# Patient Record
(demographics unavailable — no encounter records)

---

## 2024-10-07 NOTE — HISTORY OF PRESENT ILLNESS
[None] : The patient is currently asymptomatic [FreeTextEntry1] : 58-year-old female with a medical history of hypertension, hyperlipidemia,  diabetes mellitus?, who presents with joint pain in both hands. She is accompanied by her son who helped for translation assistance.  The patient reports that her hand joint pain started approximately one year ago and occurs daily. She occasionally takes Tylenol and uses Voltaren gel twice daily, which provides some benefit. Approximately 5 months ago, she visited the ED at [facility] for similar symptoms. At that time, X-rays of both hands were performed, revealing degenerative changes in the DIP and CMC joints. Following this visit, she was referred to rheumatology for further evaluation. The patient denies experiencing any additional symptoms, including pain in other joints, oral or nasal ulcers, alopecia, rash, fever, weight loss, shortness of breath, or chest pain.  No family history of CTD

## 2024-10-07 NOTE — ASSESSMENT
[FreeTextEntry1] : # CMC joint OA - Reports pain in both joints since last year  - Tenderness in CMC joints and X-ray findings of degenerative changes in the DIP and CMC joints  - Continue the use of Tylenol as needed and increase Voltaren gel 3-4 times daily. - Discussed the importance of joint protection strategies, activity modification, and the potential role of physical therapy to improve function and reduce pain. Referral given to Hand therapy  - Order labs   RTC in 3 months  D/w Dr. Laz Horan MD Rheumatology Fellow PGY-5

## 2024-10-07 NOTE — PHYSICAL EXAM
[General Appearance - Alert] : alert [General Appearance - In No Acute Distress] : in no acute distress [Sclera] : the sclera and conjunctiva were normal [PERRL With Normal Accommodation] : pupils were equal in size, round, and reactive to light [Extraocular Movements] : extraocular movements were intact [Outer Ear] : the ears and nose were normal in appearance [Oropharynx] : the oropharynx was normal [Neck Appearance] : the appearance of the neck was normal [Neck Cervical Mass (___cm)] : no neck mass was observed [Jugular Venous Distention Increased] : there was no jugular-venous distention [Thyroid Diffuse Enlargement] : the thyroid was not enlarged [Thyroid Nodule] : there were no palpable thyroid nodules [Auscultation Breath Sounds / Voice Sounds] : lungs were clear to auscultation bilaterally [Heart Rate And Rhythm] : heart rate was normal and rhythm regular [Heart Sounds] : normal S1 and S2 [Bowel Sounds] : normal bowel sounds [Abdomen Soft] : soft [Abdomen Tenderness] : non-tender [Abdomen Mass (___ Cm)] : no abdominal mass palpated [Skin Color & Pigmentation] : normal skin color and pigmentation [] : no rash [Skin Turgor] : normal skin turgor [Deep Tendon Reflexes (DTR)] : deep tendon reflexes were 2+ and symmetric [Sensation] : the sensory exam was normal to light touch and pinprick [No Focal Deficits] : no focal deficits [Oriented To Time, Place, And Person] : oriented to person, place, and time [Impaired Insight] : insight and judgment were intact [Affect] : the affect was normal [FreeTextEntry1] : Tenderness on the CMC joints, no swelling. No synovitis

## 2024-10-07 NOTE — HISTORY OF PRESENT ILLNESS
[None] : The patient is currently asymptomatic [FreeTextEntry1] : 58-year-old female with a medical history of hypertension, hyperlipidemia,  diabetes mellitus?, who presents with joint pain in both hands. She is accompanied by her son who helped for translation assistance.  The patient reports that her hand joint pain started approximately one year ago and occurs daily. She occasionally takes Tylenol and uses Voltaren gel twice daily, which provides some benefit. Approximately 5 months ago, she visited the ED at [facility] for similar symptoms. At that time, X-rays of both hands were performed, revealing degenerative changes in the DIP and CMC joints. Following this visit, she was referred to rheumatology for further evaluation. The patient denies experiencing any additional symptoms, including pain in other joints, oral or nasal ulcers, alopecia, rash, fever, weight loss, shortness of breath, or chest pain.  No family history of CTD

## 2024-10-07 NOTE — PHYSICAL EXAM
[General Appearance - Alert] : alert [General Appearance - In No Acute Distress] : in no acute distress [Sclera] : the sclera and conjunctiva were normal [PERRL With Normal Accommodation] : pupils were equal in size, round, and reactive to light [Extraocular Movements] : extraocular movements were intact [Outer Ear] : the ears and nose were normal in appearance [Oropharynx] : the oropharynx was normal [Neck Appearance] : the appearance of the neck was normal [Jugular Venous Distention Increased] : there was no jugular-venous distention [Neck Cervical Mass (___cm)] : no neck mass was observed [Thyroid Diffuse Enlargement] : the thyroid was not enlarged [Thyroid Nodule] : there were no palpable thyroid nodules [Auscultation Breath Sounds / Voice Sounds] : lungs were clear to auscultation bilaterally [Heart Rate And Rhythm] : heart rate was normal and rhythm regular [Heart Sounds] : normal S1 and S2 [Bowel Sounds] : normal bowel sounds [Abdomen Soft] : soft [Abdomen Tenderness] : non-tender [Abdomen Mass (___ Cm)] : no abdominal mass palpated [Skin Color & Pigmentation] : normal skin color and pigmentation [Skin Turgor] : normal skin turgor [] : no rash [Deep Tendon Reflexes (DTR)] : deep tendon reflexes were 2+ and symmetric [Sensation] : the sensory exam was normal to light touch and pinprick [No Focal Deficits] : no focal deficits [Oriented To Time, Place, And Person] : oriented to person, place, and time [Impaired Insight] : insight and judgment were intact [Affect] : the affect was normal [FreeTextEntry1] : Tenderness on the CMC joints, no swelling. No synovitis

## 2025-02-07 NOTE — HISTORY OF PRESENT ILLNESS
[FreeTextEntry1] : This is a 58-year-old female who has come to the office referred by [facility] upon discharge from the emergency department.  Patient had gone to the emergency department at [facility] on January of 10th 2025 for abdominal or chest discomfort had a workup performed there and was asked to follow-up with the vascular as well as cardiology.  Patient has come to the office accompanied by her family member.  Patient states that she is asymptomatic at this time has no pain or discomfort in the abdomen or the chest.  According to the history as well as reviewing the reports from the hospital she is referred here for dilatation of the aorta in the chest patient had undergone a CAT scan of the chest and abdomen on 10th January and the reports are available and reviewed.  Patient has a 4.6 cm dilatation of the ascending aorta with no evidence of abdominal aortic aneurysm or descending thoracic aorta.  The patient also was advised to follow-up with the cardiology for which patient has made appointment patient has no chest pain no back pain no upper back pain.  Patient has a history of arthritis of both hands this especially in the CMC joints and is being followed by rheumatology clinic.  The patient says her right hand bothers her more than the left.  The patient is currently on gabapentin, meloxicam, methocarbamol, calcium and bisoprolol. The patient does not speak English well or understands patient speaks in the animal Wolof and the family member agreed for interpretation.

## 2025-02-07 NOTE — ASSESSMENT
[FreeTextEntry1] : This is a 58-year-old female who has come to the office post discharge from the emergency department as a routine follow-up for her 4.6 cm ascending aortic aneurysm dilatation with no abdominal aortic dilatation or aneurysm.

## 2025-02-07 NOTE — PHYSICAL EXAM
[JVD] : no jugular venous distention  [Normal Thyroid] : the thyroid was normal [Carotid Bruits] : no carotid bruits [Normal Breath Sounds] : Normal breath sounds [Normal Heart Sounds] : normal heart sounds [Right Carotid Bruit] : no bruit heard over the right carotid [Left Carotid Bruit] : no bruit heard over the left carotid [2+] : left 2+ [Right Femoral Bruit] : no bruit heard over the right femoral artery [Left Femoral Bruit] : no bruit heard over the left femoral artery [1+] : left 1+ [Ankle Swelling (On Exam)] : not present [Varicose Veins Of Lower Extremities] : not present [] : not present [Abdomen Masses] : No abdominal masses [Enlarged] : not enlarged [Tender] : was nontender [Stool Sample Taken] : No stool obtained  on rectal exam [No Rash or Lesion] : No rash or lesion [Purpura] : no purpura  [Petechiae] : no petechiae [Skin Ulcer] : no ulcer [Skin Induration] : no induration [Alert] : not alert [Oriented to Person] : disoriented to person [Oriented to Place] : disoriented to place [Oriented to Time] : disoriented to time [Calm] : calm [de-identified] : Well-built well-nourished [de-identified] : Within normal limits [de-identified] : Within normal limits [TextEntry] : Patient is seen in supine sitting and standing positions.  Head and neck upper extremity exams are done.  Patient has a swelling on the both hands more on the right than the left particularly at the carpometacarpal junction of the right thumb on the palmar hand.  The findings consistent with her history of arthritis and similar findings on the left hand to a lesser degree.  Respiratory cardiac exams are grossly normal abdomen is soft nonpulsatile nontender nondistended.  Both lower extremities without any swelling at the ankles and grossly with palpable pedal pulses and no venous stasis.  No abdominal no flank tenderness

## 2025-02-07 NOTE — DATA REVIEWED
[FreeTextEntry1] : CT scan of chest abdomen and pelvis from January 10 is reviewed and shows ascending aortic aneurysm of 4.6 cm.  No abdominal aortic aneurysm

## 2025-02-07 NOTE — PLAN
[TextEntry] : I have discussed at length with the patient and the patient's family member explained to her in her own language in the as well as English and have advised her to follow-up with cardiology.  She does have an appointment with a cardiologist Dr.poumpouridis Gupta.  She would benefit from a consultation with cardiologist as well as a possible cardiac surgeon for the follow-up and surveillance and eventually if needed repair of the ascending aortic aneurysm.  I have explained to the patient as well as her daughter that I do not perform those kind of surgeries and usually performed by the cardiac surgeon.  No further appointment is given to my office.

## 2025-02-07 NOTE — REASON FOR VISIT
Echo  -   07/29/24     EF reported at 50% (measured 58% biplane).  Anterolateral wall motion abnormality.    Moderate to severe aortic stenosis reported (mean gradient 16, DI 0.28, JOSE ARMANDO 0.9, SVI 37, LVOT diameter measurement may have been slightly low).    Mild AI.  Moderate MR.  [Consultation] : a consultation visit

## 2025-02-13 NOTE — REASON FOR VISIT
[FreeTextEntry1] : 58 year old Female with PMH of HLD, bilateral hand arthritis, pre-DM, HTN, Gastritis, who is here for cardiology referral. Went to [place] ED on 01/10/25 for chest pain and dyspnea, had troponin <3, ECG without significant ischemic changes, CTA C/A/P which showed 4.6 ascending aortic dilation but no dissection, heart appeared within normal limits, and bilateral atelectasis. BNP 71. She was given some pain medication and symptoms resolved, discharged with plan for vascular and cardiology eval. Had n/v with pain and all symptoms resolved with anti-emetics and PPI. Daughter thinks patient was stressed during that episode.   Saw vascular who recommended cardiology/cardiac surgeon f/u of aortic aneurysm.   She has unlimited exercise tolerance, constantly walking and going up stairs. No chest pain since the ED visit which was thought to be more abdominal in nature. No family history of aortic dissection, MI, or SCD.  Today, vitals are WNL /88, HR 70, spo2 96% on RA.   A1C in October 2024 was 6.3%. At [facility] last month, CMP, CBC unremarkable. Will plan for stress test, cannot do exercise given 4.6 cm ascending thoracic aortic aneurysm so will go for pharmacologic nuclear stress test. Will have family fax over Lipid profile from outpatient PCP and patient will follow up in 6 months with me. At that time, will get surveillance imaging of aorta.   Medications: Pantoprazole Bisoprolol unknown dose  Methocarbamol Simvastatin 20 mg Meloxicam Gabapentin

## 2025-02-13 NOTE — REASON FOR VISIT
[FreeTextEntry1] : 58 year old Female with PMH of HLD, bilateral hand arthritis, pre-DM, HTN, Gastritis, who is here for cardiology referral. Went to [place] ED on 01/10/25 for chest pain and dyspnea, had troponin <3, ECG without significant ischemic changes, CTA C/A/P which showed 4.6 ascending aortic dilation but no dissection, heart appeared within normal limits, and bilateral atelectasis. BNP 71. She was given some pain medication and symptoms resolved, discharged with plan for vascular and cardiology eval. Had n/v with pain and all symptoms resolved with anti-emetics and PPI. Daughter thinks patient was stressed during that episode.   Saw vascular who recommended cardiology/cardiac surgeon f/u of aortic aneurysm.   She has unlimited exercise tolerance, constantly walking and going up stairs. No chest pain since the ED visit which was thought to be more abdominal in nature. No family history of aortic dissection, MI, or SCD.  Today, vitals are WNL /88, HR 70, spo2 96% on RA.   A1C in October 2024 was 6.3%. At [place] last month, CMP, CBC unremarkable. Will plan for stress test, cannot do exercise given 4.6 cm ascending thoracic aortic aneurysm so will go for pharmacologic nuclear stress test. Will have family fax over Lipid profile from outpatient PCP and patient will follow up in 6 months with me. At that time, will get surveillance imaging of aorta.   Medications: Pantoprazole Bisoprolol unknown dose  Methocarbamol Simvastatin 20 mg Meloxicam Gabapentin

## 2025-02-13 NOTE — REASON FOR VISIT
[FreeTextEntry1] : 58 year old Female with PMH of HLD, bilateral hand arthritis, pre-DM, HTN, Gastritis, who is here for cardiology referral. Went to [place] ED on 01/10/25 for chest pain and dyspnea, had troponin <3, ECG without significant ischemic changes, CTA C/A/P which showed 4.6 ascending aortic dilation but no dissection, heart appeared within normal limits, and bilateral atelectasis. BNP 71. She was given some pain medication and symptoms resolved, discharged with plan for vascular and cardiology eval. Had n/v with pain and all symptoms resolved with anti-emetics and PPI. Daughter thinks patient was stressed during that episode.   Saw vascular who recommended cardiology/cardiac surgeon f/u of aortic aneurysm.   She has unlimited exercise tolerance, constantly walking and going up stairs. No chest pain since the ED visit which was thought to be more abdominal in nature. No family history of aortic dissection, MI, or SCD.  Today, vitals are WNL /88, HR 70, spo2 96% on RA.   A1C in October 2024 was 6.3%. At [place] last month, CMP, CBC unremarkable. Will plan for stress test, cannot do exercise given 4.6 cm ascending thoracic aortic aneurysm so will go for pharmacologic nuclear stress test. Will have family fax over Lipid profile from outpatient PCP and patient will follow up in 6 months with me. At that time, will get surveillance imaging of aorta.   Medications: Pantoprazole Bisoprolol unknown dose  Methocarbamol Simvastatin 20 mg Meloxicam Gabapentin

## 2025-02-13 NOTE — ASSESSMENT
[FreeTextEntry1] : 58 year old Female with PMH of HLD, bilateral hand arthritis, pre-DM, HTN, Gastritis, who is here for cardiology referral. Went to [place] ED on 01/10/25 for chest pain and dyspnea, had troponin <3, ECG without significant ischemic changes, CTA C/A/P which showed 4.6 ascending aortic dilation but no dissection, heart appeared within normal limits, and bilateral atelectasis. BNP 71. She was given some pain medication and symptoms resolved, discharged with plan for vascular and cardiology eval. Had n/v with pain and all symptoms resolved with anti-emetics and PPI. Daughter thinks patient was stressed during that episode.   #Chest Pain, Atypical #Ascending Aortic Aneurysm - CTA C/A/P which showed 4.6 ascending aortic dilation but no dissection, 4.2 cm aortic root. She has never smoked tobacco products. She has unlimited exercise tolerance, constantly walking and going up stairs. No chest pain since the ED visit which was thought to be more abdominal in nature. No family history of aortic dissection, MI, or SCD. Today, vitals are WNL /88, HR 70, spo2 96% on RA. A1C in October 2024 was 6.3%. At [place] last month, CMP, CBC unremarkable. Recs: -Will plan for stress test, cannot do exercise given 4.6 cm ascending thoracic aortic aneurysm so will go for pharmacologic nuclear stress test.  - Will have family fax over Lipid profile from outpatient PCP and patient will follow up in 6 months with me. At that time, will get surveillance imaging of aorta.  - discussed with patient and family that she should not exert herself more than moderate/light exercise in setting of aortic aneurysm. - BP well controlled today, continue current meds

## 2025-02-13 NOTE — ASSESSMENT
[FreeTextEntry1] : 58 year old Female with PMH of HLD, bilateral hand arthritis, pre-DM, HTN, Gastritis, who is here for cardiology referral. Went to [place] ED on 01/10/25 for chest pain and dyspnea, had troponin <3, ECG without significant ischemic changes, CTA C/A/P which showed 4.6 ascending aortic dilation but no dissection, heart appeared within normal limits, and bilateral atelectasis. BNP 71. She was given some pain medication and symptoms resolved, discharged with plan for vascular and cardiology eval. Had n/v with pain and all symptoms resolved with anti-emetics and PPI. Daughter thinks patient was stressed during that episode.   #Chest Pain, Atypical #Ascending Aortic Aneurysm - CTA C/A/P which showed 4.6 ascending aortic dilation but no dissection, 4.2 cm aortic root. She has never smoked tobacco products. She has unlimited exercise tolerance, constantly walking and going up stairs. No chest pain since the ED visit which was thought to be more abdominal in nature. No family history of aortic dissection, MI, or SCD. Today, vitals are WNL /88, HR 70, spo2 96% on RA. A1C in October 2024 was 6.3%. At [place] last month, CMP, CBC unremarkable. Recs: -Will plan for stress test, cannot do exercise given 4.6 cm ascending thoracic aortic aneurysm so will go for pharmacologic nuclear stress test.  - Will have family fax over Lipid profile from outpatient PCP and patient will follow up in 6 months with me. At that time, will get surveillance imaging of aorta.  - discussed with patient and family that she should not exert herself more than moderate/light exercise in setting of aortic aneurysm. - BP well controlled today, continue current meds

## 2025-02-13 NOTE — ASSESSMENT
[FreeTextEntry1] : 58 year old Female with PMH of HLD, bilateral hand arthritis, pre-DM, HTN, Gastritis, who is here for cardiology referral. Went to [place] ED on 01/10/25 for chest pain and dyspnea, had troponin <3, ECG without significant ischemic changes, CTA C/A/P which showed 4.6 ascending aortic dilation but no dissection, heart appeared within normal limits, and bilateral atelectasis. BNP 71. She was given some pain medication and symptoms resolved, discharged with plan for vascular and cardiology eval. Had n/v with pain and all symptoms resolved with anti-emetics and PPI. Daughter thinks patient was stressed during that episode.   #Chest Pain, Atypical #Ascending Aortic Aneurysm - CTA C/A/P which showed 4.6 ascending aortic dilation but no dissection, 4.2 cm aortic root. She has never smoked tobacco products. She has unlimited exercise tolerance, constantly walking and going up stairs. No chest pain since the ED visit which was thought to be more abdominal in nature. No family history of aortic dissection, MI, or SCD. Today, vitals are WNL /88, HR 70, spo2 96% on RA. A1C in October 2024 was 6.3%. At [place] last month, CMP, CBC unremarkable. Recs: -Will plan for stress test, cannot do exercise given 4.6 cm ascending thoracic aortic aneurysm so will go for pharmacologic nuclear stress test.  - Will have family fax over Lipid profile from outpatient PCP and patient will follow up in 6 months with me. At that time, will get surveillance imaging of aorta.  - discussed with patient and family that she should not exert herself more than moderate/light exercise in setting of aortic aneurysm. - BP well controlled today, continue current meds